# Patient Record
(demographics unavailable — no encounter records)

---

## 2025-02-10 NOTE — PHYSICAL EXAM
[No Acute Distress] : no acute distress [Well Nourished] : well nourished [Well Developed] : well developed [Well-Appearing] : well-appearing [Normal] : no acute distress, well nourished, well developed and well-appearing [Normal Sclera/Conjunctiva] : normal sclera/conjunctiva [Speech Grossly Normal] : speech grossly normal [Normal Affect] : the affect was normal [Alert and Oriented x3] : oriented to person, place, and time [Normal Mood] : the mood was normal [Normal Insight/Judgement] : insight and judgment were intact

## 2025-02-10 NOTE — HEALTH RISK ASSESSMENT
[No] : No [0] : 2) Feeling down, depressed, or hopeless: Not at all (0) [Never] : Never [[id number]] : 0

## 2025-02-10 NOTE — REVIEW OF SYSTEMS
[Negative] : Psychiatric [Fatigue] : no fatigue [Recent Change In Weight] : ~T no recent weight change [Chest Pain] : no chest pain [Palpitations] : no palpitations [Shortness Of Breath] : no shortness of breath [Cough] : no cough [Joint Pain] : no joint pain [Muscle Weakness] : no muscle weakness [Itching] : no itching [Headache] : no headache [Dizziness] : no dizziness [Fainting] : no fainting [Confusion] : no confusion [Unsteady Walk] : no ataxia [Memory Loss] : no memory loss [Suicidal] : not suicidal [Insomnia] : no insomnia [Anxiety] : no anxiety [Depression] : no depression

## 2025-02-10 NOTE — HISTORY OF PRESENT ILLNESS
[Telephone (audio)] : This telephonic visit was provided via audio only technology. [Other Location: e.g. School (Enter Location, City,State)___] : at [unfilled], at the time of the visit. [Medical Office: ([facility])___] : at the medical office located in  [Verbal consent obtained from patient] : the patient, [unfilled] [FreeTextEntry1] : I need a refill for Effexor [de-identified] : Call started out as telehealth but connection was poor and he could not be heard. Call continued on audio only. He informs that he takes Effexor for a history of anxiety and depression. He has been taking this for about 5-6 years. He is doing well on his dose of 75mg. He is requesting only a re-fill today.

## 2025-02-10 NOTE — HISTORY OF PRESENT ILLNESS
[Telephone (audio)] : This telephonic visit was provided via audio only technology. [Other Location: e.g. School (Enter Location, City,State)___] : at [unfilled], at the time of the visit. [Medical Office: ([facility])___] : at the medical office located in  [Verbal consent obtained from patient] : the patient, [unfilled] [FreeTextEntry1] : I need a refill for Effexor [de-identified] : Call started out as telehealth but connection was poor and he could not be heard. Call continued on audio only. He informs that he takes Effexor for a history of anxiety and depression. He has been taking this for about 5-6 years. He is doing well on his dose of 75mg. He is requesting only a re-fill today.

## 2025-02-10 NOTE — HEALTH RISK ASSESSMENT
[No] : No [0] : 2) Feeling down, depressed, or hopeless: Not at all (0) [Never] : Never [QQR4Hlgov] : 0

## 2025-03-20 NOTE — REVIEW OF SYSTEMS
[facility]      Patient Name: Angel Mas    Patient : 2003    Patient MRN: [id number]    Primary Language: English      Date of Service: 3/20/2025    Type of Service: Medication management    Other Services Involved: N/A      Angel Mas, was evaluated through a synchronous (real-time) audio-video encounter. The patient (or guardian if applicable) is aware that this is a billable service, which includes applicable co-pays. This Virtual Visit was conducted with patient's (and/or legal guardian's) consent. Patient identification was verified, and a caregiver was present when appropriate.   The patient was located at Home: [address] [place] [id number]  Provider was located at Home (Appt Dept State): [place]  Confirm you are appropriately licensed, registered, or certified to deliver care in the state where the patient is located as indicated above. If you are not or unsure, please re-schedule the visit: Yes, I confirm.        Phone Number: [phone]   Emergency Contact: father Cooper, [phone]       Chief Complaint: \"I've been good\"       History of Present Illness    Angel Mas \"Skip\" is a 21 y.o. male with reported prior psychiatric history significant for depression, ADHD who presents for medication management. They are currently prescribed: Vyvanse 40 mg daily.    Pt reports that he has been doing well, but states that he has been losing wt 2/2 decreased appetite (roughly 7 lbs). However, he believes that he has responded positively to increase of Vyvanse and can appreciate improved focus, ability to complete tasks. Denies other new or significant SE. Denies SI/HI, A/VH, paranoia or delusions.      Last Visit (25):  Pt reports that he has been compliant with Vyvanse, but notes that he thinks that it has been less effective than initially. States that he is able to sleep more easily now and typically takes dose around 1000.  [Negative] : Psychiatric [Fatigue] : no fatigue [Recent Change In Weight] : ~T no recent weight change [Chest Pain] : no chest pain [Palpitations] : no palpitations [Shortness Of Breath] : no shortness of breath [Cough] : no cough [Joint Pain] : no joint pain [Muscle Weakness] : no muscle weakness [Itching] : no itching [Headache] : no headache [Dizziness] : no dizziness [Fainting] : no fainting [Confusion] : no confusion [Unsteady Walk] : no ataxia [Memory Loss] : no memory loss [Suicidal] : not suicidal [Insomnia] : no insomnia [Anxiety] : no anxiety [Depression] : no depression

## 2025-05-29 NOTE — PHYSICAL EXAM
[No Acute Distress] : no acute distress [Well Nourished] : well nourished [Well Developed] : well developed [Well-Appearing] : well-appearing [Normal Sclera/Conjunctiva] : normal sclera/conjunctiva [PERRL] : pupils equal round and reactive to light [EOMI] : extraocular movements intact [Normal Outer Ear/Nose] : the outer ears and nose were normal in appearance [Normal Oropharynx] : the oropharynx was normal [No JVD] : no jugular venous distention [No Lymphadenopathy] : no lymphadenopathy [Supple] : supple [Thyroid Normal, No Nodules] : the thyroid was normal and there were no nodules present [No Respiratory Distress] : no respiratory distress  [No Accessory Muscle Use] : no accessory muscle use [Clear to Auscultation] : lungs were clear to auscultation bilaterally [Normal Rate] : normal rate  [Regular Rhythm] : with a regular rhythm [Normal S1, S2] : normal S1 and S2 [No Murmur] : no murmur heard [No Abdominal Bruit] : a ~M bruit was not heard ~T in the abdomen [No Varicosities] : no varicosities [No Edema] : there was no peripheral edema [Soft] : abdomen soft [Non Tender] : non-tender [Non-distended] : non-distended [No Masses] : no abdominal mass palpated [No HSM] : no HSM [Normal Posterior Cervical Nodes] : no posterior cervical lymphadenopathy [Normal Anterior Cervical Nodes] : no anterior cervical lymphadenopathy [No CVA Tenderness] : no CVA  tenderness [No Spinal Tenderness] : no spinal tenderness [No Joint Swelling] : no joint swelling [Grossly Normal Strength/Tone] : grossly normal strength/tone [No Rash] : no rash [Coordination Grossly Intact] : coordination grossly intact [No Focal Deficits] : no focal deficits [Normal Gait] : normal gait [Normal Affect] : the affect was normal [Normal Insight/Judgement] : insight and judgment were intact

## 2025-05-31 NOTE — HEALTH RISK ASSESSMENT
[Very Good] : ~his/her~ current health as very good [Fair] :  ~his/her~ mood as fair [No] : No [2 - 3 times a week (3 pts)] : 2 - 3  times a week (3 points) [1 or 2 (0 pts)] : 1 or 2 (0 points) [Never (0 pts)] : Never (0 points) [2] : 2) Feeling down, depressed, or hopeless for more than half of the days (2) [Nearly Every Day (3)] : 6.) Feeling bad about yourself, or that you are a failure, or have let yourself or your family down? Nearly every day [1/2 of Days or More (2)] : 7.) Trouble concentrating on things, such as reading a newspaper or watching television? Half the days or more [Not at All (0)] : 9.) Thoughts that you would be off dead or of hurting yourself in some way? Not at all [Moderate] : Severity of Depression is Moderate [PHQ-9 Positive] : PHQ-9 Positive [Never] : Never [HIV test declined] : HIV test declined [Employed] : employed [# Of Children ___] : has [unfilled] children [Reports changes in dental health] : Reports changes in dental health [Yes] : Yes [I have developed a follow-up plan documented below in the note.] : I have developed a follow-up plan documented below in the note. [Change in mental status noted] : No change in mental status noted [Reports changes in hearing] : Reports no changes in hearing [Reports changes in vision] : Reports no changes in vision [MammogramComments] : n/a [PapSmearComments] : n/a [BoneDensityComments] : n/a [ColonoscopyComments] : no history- hasnt had colonoscopy [de-identified] : lives with children- 18 and 14 [FreeTextEntry2] :  at [place]

## 2025-05-31 NOTE — HEALTH RISK ASSESSMENT
[Very Good] : ~his/her~ current health as very good [Fair] :  ~his/her~ mood as fair [No] : No [2 - 3 times a week (3 pts)] : 2 - 3  times a week (3 points) [1 or 2 (0 pts)] : 1 or 2 (0 points) [Never (0 pts)] : Never (0 points) [2] : 2) Feeling down, depressed, or hopeless for more than half of the days (2) [Nearly Every Day (3)] : 6.) Feeling bad about yourself, or that you are a failure, or have let yourself or your family down? Nearly every day [1/2 of Days or More (2)] : 7.) Trouble concentrating on things, such as reading a newspaper or watching television? Half the days or more [Not at All (0)] : 9.) Thoughts that you would be off dead or of hurting yourself in some way? Not at all [Moderate] : Severity of Depression is Moderate [PHQ-9 Positive] : PHQ-9 Positive [Never] : Never [HIV test declined] : HIV test declined [Employed] : employed [# Of Children ___] : has [unfilled] children [Reports changes in dental health] : Reports changes in dental health [Yes] : Yes [I have developed a follow-up plan documented below in the note.] : I have developed a follow-up plan documented below in the note. [Change in mental status noted] : No change in mental status noted [Reports changes in hearing] : Reports no changes in hearing [Reports changes in vision] : Reports no changes in vision [MammogramComments] : n/a [PapSmearComments] : n/a [BoneDensityComments] : n/a [ColonoscopyComments] : no history- hasnt had colonoscopy [de-identified] : lives with children- 18 and 14 [FreeTextEntry2] :  at [place]

## 2025-05-31 NOTE — HISTORY OF PRESENT ILLNESS
[FreeTextEntry1] : annual [de-identified] : 51M here for yearly physical and labs. Patient is alert and has no acute complaints. Well appearing. Denies any shortness of breath, chest pain, weakness, change in hair/skin/nails, fatigue, change in bowel/bladder habits, palpitations, headaches, changes to weight. A&Ox3 in no distress. Sees psychologist regularly for therapy. States he takes venlafaxine (cut himself down to about half due to fatigue). He takes this for anxiety and depression. States this is the first medication he has ever tried for anxiety and depression. States the 75mg makes him very tired. Does feel slighly more depressed than normal. Denies SI/HI. Has a family history of elevated cholesterol. Eats relatively healthy. Exercises regularly as well- not as much as he wishes.  No

## 2025-05-31 NOTE — HISTORY OF PRESENT ILLNESS
[FreeTextEntry1] : annual [de-identified] : 51M here for yearly physical and labs. Patient is alert and has no acute complaints. Well appearing. Denies any shortness of breath, chest pain, weakness, change in hair/skin/nails, fatigue, change in bowel/bladder habits, palpitations, headaches, changes to weight. A&Ox3 in no distress. Sees psychologist regularly for therapy. States he takes venlafaxine (cut himself down to about half due to fatigue). He takes this for anxiety and depression. States this is the first medication he has ever tried for anxiety and depression. States the 75mg makes him very tired. Does feel slighly more depressed than normal. Denies SI/HI. Has a family history of elevated cholesterol. Eats relatively healthy. Exercises regularly as well- not as much as he wishes.

## 2025-05-31 NOTE — PLAN
[FreeTextEntry1] : patient well appearing aware to follow up with psych NP if unable to get appt f/u in 2-4 weeks to touch base sent labs, stable vitals